# Patient Record
Sex: MALE | Race: WHITE | NOT HISPANIC OR LATINO | Employment: PART TIME | URBAN - METROPOLITAN AREA
[De-identification: names, ages, dates, MRNs, and addresses within clinical notes are randomized per-mention and may not be internally consistent; named-entity substitution may affect disease eponyms.]

---

## 2017-12-14 ENCOUNTER — APPOINTMENT (EMERGENCY)
Dept: RADIOLOGY | Facility: HOSPITAL | Age: 47
End: 2017-12-14
Payer: COMMERCIAL

## 2017-12-14 ENCOUNTER — HOSPITAL ENCOUNTER (EMERGENCY)
Facility: HOSPITAL | Age: 47
Discharge: HOME | End: 2017-12-14
Attending: EMERGENCY MEDICINE | Admitting: EMERGENCY MEDICINE
Payer: COMMERCIAL

## 2017-12-14 VITALS
OXYGEN SATURATION: 98 % | TEMPERATURE: 97.2 F | HEART RATE: 70 BPM | WEIGHT: 200 LBS | SYSTOLIC BLOOD PRESSURE: 146 MMHG | HEIGHT: 66 IN | BODY MASS INDEX: 32.14 KG/M2 | RESPIRATION RATE: 18 BRPM | DIASTOLIC BLOOD PRESSURE: 103 MMHG

## 2017-12-14 DIAGNOSIS — S63.501A SPRAIN OF RIGHT WRIST, INITIAL ENCOUNTER: ICD-10-CM

## 2017-12-14 DIAGNOSIS — D22.9 CHANGE IN NEVUS: Primary | ICD-10-CM

## 2017-12-14 PROCEDURE — 73130 X-RAY EXAM OF HAND: CPT

## 2017-12-14 PROCEDURE — 99283 EMERGENCY DEPT VISIT LOW MDM: CPT

## 2017-12-14 NOTE — ED PROVIDER NOTES
History  Chief Complaint   Patient presents with    Hand Injury     patient is c/o right hand and wrist pain, states that it began 2 weeks ago after punching a punching bag  also states "i have this lump of my face that is changing colors"     Patient is a 51-year-old male coming in today with right hand pain after punching a punching bag approximately 2 weeks ago  There is no other injury sustained  He did not fall tripped per landed on it  There is no numbness or paresthesias  He is right-hand dominant  Patient states it is just achy and throbbing  He has not taken anything for this  The pain does not radiate past the wrist or into his elbow  There is no sustained laceration or open injury  Patient also worried about a spot on the right side of his cheek for the past month  Patient states over the past month he has been using a tanning bed at his gym and someone pointed out to him that the spot is getting darker  He has never been to a dermatologist and does not use S PF lotions  The spot is not itchy, bleeding, or flaky  He has not put anything on it  History provided by:  Patient   used: No        None       History reviewed  No pertinent past medical history  History reviewed  No pertinent surgical history  History reviewed  No pertinent family history  I have reviewed and agree with the history as documented  Social History   Substance Use Topics    Smoking status: Never Smoker    Smokeless tobacco: Never Used    Alcohol use Yes      Comment: social        Review of Systems   Constitutional: Negative for fatigue and fever  Musculoskeletal: Negative for arthralgias, back pain, gait problem, joint swelling, myalgias, neck pain and neck stiffness  Skin: Negative for pallor, rash and wound         Physical Exam  ED Triage Vitals [12/14/17 1245]   Temperature Pulse Respirations Blood Pressure SpO2   (!) 97 2 °F (36 2 °C) 70 18 (!) 146/103 98 %      Temp Source Heart Rate Source Patient Position - Orthostatic VS BP Location FiO2 (%)   Temporal Monitor Sitting Right arm --      Pain Score       8           Orthostatic Vital Signs  Vitals:    12/14/17 1245   BP: (!) 146/103   Pulse: 70   Patient Position - Orthostatic VS: Sitting       Physical Exam   Constitutional: He is oriented to person, place, and time  He appears well-developed and well-nourished  No distress  HENT:   Head: Normocephalic and atraumatic  Nose: Nose normal    Mouth/Throat: Oropharynx is clear and moist    Eyes: Conjunctivae and EOM are normal  Pupils are equal, round, and reactive to light  Neck: Normal range of motion  No tracheal deviation present  Pulmonary/Chest: No stridor  No respiratory distress  Musculoskeletal:        Right wrist: He exhibits normal range of motion, no bony tenderness, no swelling, no effusion, no crepitus, no deformity and no laceration  Arms:  At the right wrist there is no tenderness to palpation  Negative Finkelstein's on the right negative Tinel on the right  There is mild tenderness along the right anatomical snuffbox  There is no obvious external evidence of trauma  There is no tenderness along the 5th metacarpal bone  Lymphadenopathy:     He has no cervical adenopathy  Neurological: He is oriented to person, place, and time  Skin: Skin is warm  Capillary refill takes less than 2 seconds  He is not diaphoretic  Nursing note and vitals reviewed  ED Medications  Medications - No data to display    Diagnostic Studies  Results Reviewed     None                 XR hand 3+ views RIGHT   Final Result by Liliana Velasco MD (12/14 1326)   Probable old healed boxer's type fracture 5th metacarpal      No acute osseous abnormality           Workstation performed: [id number]                    Procedures  Procedures       Phone Contacts  ED Phone Contact    ED Course  ED Course                                MDM  Number of Diagnoses or Management Options  Change in nevus:   Sprain of right wrist, initial encounter:   Diagnosis management comments: Long discussion with patient regarding need for follow-up with PCP and/or Dermatology regarding nevus  1:36 PM  Patient asking to leave stating he is here on his lunch break and needs to the the discharge  Updated on x-ray the patient has no pain on 5th carpal bone  He states he is aware of this old fracture  Will place in a Velcro splint as there is no acute fracture and patient signs and symptoms consistent with wrist sprain  Patient I did initially discuss his need for moisturizer, use of S PF, and to stop using the tanning bed  He is also aware we did not perform scrape biopsies or biopsies in the emergency department  Although I do not feel this nevus appears to be melanoma in nature he is aware that I cannot rule this out and LEs he is seen and had obtained a biopsy by dermatologist        Amount and/or Complexity of Data Reviewed  Tests in the radiology section of CPT®: ordered and reviewed  Independent visualization of images, tracings, or specimens: yes    Risk of Complications, Morbidity, and/or Mortality  Presenting problems: low  Diagnostic procedures: low  Management options: low      CritCare Time    Disposition  Final diagnoses:   Change in nevus   Sprain of right wrist, initial encounter     Time reflects when diagnosis was documented in both MDM as applicable and the Disposition within this note     Time User Action Codes Description Comment    12/14/2017  1:34 PM Raghav Vang Add [D22 9] Change in nevus     12/14/2017  1:36 PM Raghav Vang Add Starr Pazs Sprain of right wrist, initial encounter       ED Disposition     ED Disposition Condition Comment    Discharge  Amy Roach discharge to home/self care      Condition at discharge: Stable        Follow-up Information     Follow up With Specialties Details Why Contact Info    Maria M Devine MD Dermatology Schedule an appointment as soon as possible for a visit in 3 days  [address] [facility] [address] [facility]      Cristiane Moon MD Internal Medicine Schedule an appointment as soon as possible for a visit in 1 week  [address] [facility]          There are no discharge medications for this patient  No discharge procedures on file      ED Provider  Electronically Signed by           Suha Reno DO  12/14/17 7097

## 2017-12-14 NOTE — DISCHARGE INSTRUCTIONS
Sprain   WHAT YOU NEED TO KNOW:   A sprain happens when a ligament is stretched or torn  Ligaments are tough tissues that connect bones  Ligaments support your joints and keep your bones in place  They allow you to lift, lower, or rotate your arms and legs  A sprain may involve one or more ligaments  DISCHARGE INSTRUCTIONS:   Seek care immediately if:   · You have numbness or tingling below the injury, such as in your fingers or toes  · The skin over your sprained area is blue or pale  · Your pain has increased or returned, even after you take pain medicine  Contact your healthcare provider if:   · Your symptoms do not better  · Your swelling has increased or returned  · Your joint becomes more weak or unstable  · You have questions or concerns about your condition or care  Medicines:   · Prescription pain medicine  may be given  Ask how to take this medicine safely  · Acetaminophen  decreases pain and fever  It is available without a doctor's order  Ask how much to take and how often to take it  Follow directions  Acetaminophen can cause liver damage if not taken correctly  · NSAIDs , such as ibuprofen, help decrease swelling, pain, and fever  This medicine is available with or without a doctor's order  NSAIDs can cause stomach bleeding or kidney problems in certain people  If you take blood thinner medicine, always ask your healthcare provider if NSAIDs are safe for you  Always read the medicine label and follow directions  · Take your medicine as directed  Contact your healthcare provider if you think your medicine is not helping or if you have side effects  Tell him or her if you are allergic to any medicine  Keep a list of the medicines, vitamins, and herbs you take  Include the amounts, and when and why you take them  Bring the list or the pill bottles to follow-up visits  Carry your medicine list with you in case of an emergency    Support devices:  Support services, such as an elastic bandage, splint, brace, or cast may be needed  These devices limit your movement and protect your joint  You may need to use crutches if the sprain is in your leg  This will help decrease your pain as you move around  Self-care:   · Rest  your joint so that it can heal  Return to normal activities as directed  · Apply ice  on your injury for 15 to 20 minutes every hour or as directed  Use an ice pack, or put crushed ice in a plastic bag  Cover it with a towel  Ice helps prevent tissue damage and decreases swelling and pain  · Compress  the injured area as directed  Ask your healthcare provider if you should wrap an elastic bandage around your injured ligament  An elastic bandage provides support and helps decrease swelling and movement so your joint can heal      · Elevate  the injured area above the level of your heart as often as you can  This will help decrease swelling and pain  Prop the injured area on pillows or blankets to keep it elevated comfortably  Physical therapy:  A physical therapist teaches you exercises to help improve movement and strength, and to decrease pain  Prevent another sprain:  Regular exercise can strengthen your muscles and help prevent another injury  Do the following before you begin or return to regular exercise or sports training:  · Ask your healthcare provider about the activities you can do  Find out how long your ligament needs to heal  Do not do any physical activity until your healthcare provider says it is okay  If you start activity too soon, you may develop a more serious injury  · Always warm up and stretch  before your regular exercise, sport, or physical activity  · Take it slow  Slowly increase how often and how long you exercise or train  Sudden increases in how often you train may cause you to overstretch or tear your ligament  · Use the right equipment  Always wear shoes that fit well and are made for the activity that you are doing   You involve activities that bend your wrist backward and increase your risk for a wrist injury  · Activities that put stress on your wrist:  If you use crutches for a long time, you may put repeated stress on your wrist  Jobs that require repetitive or forceful work with your wrist also put extra stress on your wrist      · Past injuries:  Bone fractures that did not heal properly or past sprain or strain injuries may weaken your wrist and increase your risk of another injury  · Unstable wrist:  You may have been born with weak wrist ligaments or tendons that injure easily  What are the signs and symptoms of a wrist injury? · Pain in your wrist when you injured it    · Pain, weakness, or numbness in your wrist or hand    · A feeling of something clicking, popping, or tearing inside your wrist    · A change in the shape of your wrist, hand, or fingers    · Trouble moving your wrist or hand  How is a wrist injury diagnosed? Your healthcare provider will examine your hand, arm, and wrist to check for pain, swelling, or numbness  Your healthcare provider will ask you about your signs and symptoms  He will ask how and when you hurt your wrist and if you have had a wrist injury before  He may check the movement and strength of your wrist  You may also need one or more of the following:  · X-rays: You may need x-rays of your wrist, hand, and forearm to check for broken bones  X-rays of both your injured and uninjured wrists may be taken  · CT scan: This test is also called a CAT scan  An x-ray machine uses a computer to take pictures of your forearm, wrist, and hand  The pictures may show if you have broken a bone  You may be given a dye before the pictures are taken to help healthcare providers see the pictures better  Tell the healthcare provider if you have ever had an allergic reaction to contrast dye      · MRI:  This scan uses powerful magnets and a computer to take pictures of your forearm, wrist, and hand  An MRI may show if you have broken a bone  You may be given a dye to help the pictures show up better  Tell the healthcare provider if you have ever had an allergic reaction to contrast dye  Do not enter the MRI room with anything metal  Metal can cause serious injury  Tell the healthcare provider if you have any metal in or on your body  How is a wrist injury treated? Your treatment depends on the type of wrist injury and amount of tissue damage you have  You may need any of the following:  · Wrist supports:  A cast or splint may be put on your fingers, hand, and wrist to support your wrist and prevent further damage  Ask for more information about wearing casts and splints  · NSAIDs:  These medicines decrease swelling and pain  NSAIDs are available without a doctor's order  Ask your healthcare provider which medicine is right for you  Ask how much to take and when to take it  Take as directed  NSAIDs can cause stomach bleeding and kidney problems if not taken correctly  · Pain medicine: You may be given a prescription medicine to decrease pain  Do not wait until the pain is severe before you take this medicine  · Steroids: You may be given steroids to decrease swelling and pain in your wrist  This may be given as a shot  · Arthroscopic surgery: This is a type of surgery that uses a small, flexible tube with a light and camera on the end to see inside your wrist  The tube is put into your wrist through small incisions  During this surgery, healthcare providers may repair tears and remove injured and loose tissues  · Open surgery: This surgery may be done to repair or replace a torn ligament  Your healthcare provider may use screws or wires to attach the bones in your wrist together  How can I manage my symptoms? · Rest:  You may need to rest your wrist for at least 48 hours and avoid activities that cause you pain  Ask what activities you should avoid and for how long      · Ice:  Yassine helps decrease swelling and pain  Ice may also help prevent tissue damage  Use an ice pack or put crushed ice in a plastic bag  Cover it with a towel and place it on your injured wrist for 15 to 20 minutes every hour as directed  · Compression:  Your healthcare provider may suggest you wrap your wrist with an elastic bandage  This will help decrease swelling, support your wrist, and help it heal  Wear your wrist wrap as directed  Ask for instructions about how to wrap your wrist     · Elevation:  When you sit or lie down, keep your wrist at or above the level of your heart  This may help decrease pain and swelling  · Physical therapy:  Your healthcare provider may recommend that you go to physical therapy  A physical therapist shows you how to do exercises that can help strengthen your wrist and improve its range of movement  These exercises may also help decrease your pain  What are the risks of having a wrist injury? · Wrist supports may press on nerves and blood vessels, cause pain, or irritate your skin  Wrist supports may also cause your muscles to shorten and limit the amount of movement you have in your wrist  Rest, wrist supports, and other nonsurgical treatments may not help your wrist heal, and you may need surgery  Even if you have arthroscopic surgery, you may need open surgery later  Surgery to repair your wrist injury may cause nerve and tissue damage or lead to an infection  As a result of surgery, your wrist may feel stiff and swollen  Even with treatment, your wrist may become weak, stiff, or difficult to move  · Without treatment, your symptoms, such as pain, weakness, swelling, and stiffness, may get worse  Tissues, such as nerves and muscles, may be damaged from swelling and lack of blood supply  You may have a higher risk of getting arthritis in your wrist  Your injury may prevent you from having complete movement in your hand and fingers     How can I prevent a wrist injury?    · Do strengthening exercises: Your healthcare provider or physical therapist may suggest that you do exercises to strengthen your hand and arm muscles  Ask when you may return to your regular physical activities or sports  If you start to exercise too soon it may cause you to injure your wrist again  · Protect your wrists:  Wrist guard splints or protective tape can help support your wrist during exercise and sports  These devices may also keep your wrist from bending too far back  Ask for more information about the type of wrist support that you should use  When should I contact my healthcare provider? · You have a fever  · The bruising, swelling, or pain in your wrist gets worse  · You have questions or concerns about your condition or care  When should I seek immediate care? · The skin on or near your wrist or hand feels cold, or it turns blue or white  · The skin on or near your wrist or hand is very tight and swollen  · You have new trouble moving and using your hands, fingers, or wrist     · Your wrist, hands, or fingers become swollen, red, numb, or they tingle  · Your wrist has any open wounds that are red, swollen, warm, or have pus coming from them  CARE AGREEMENT:   You have the right to help plan your care  Learn about your health condition and how it may be treated  Discuss treatment options with your caregivers to decide what care you want to receive  You always have the right to refuse treatment  The above information is an  only  It is not intended as medical advice for individual conditions or treatments  Talk to your doctor, nurse or pharmacist before following any medical regimen to see if it is safe and effective for you  © 2017 [address] [employer] Information is for End User's use only and may not be sold, redistributed or otherwise used for commercial purposes   All illustrations and images included in CareNotes® are the copyrighted property of [employer]  or [address]

## 2018-05-06 ENCOUNTER — OFFICE VISIT (OUTPATIENT)
Dept: URGENT CARE | Facility: MEDICAL CENTER | Age: 48
End: 2018-05-06
Payer: COMMERCIAL

## 2018-05-06 VITALS
TEMPERATURE: 97.6 F | DIASTOLIC BLOOD PRESSURE: 86 MMHG | HEART RATE: 66 BPM | RESPIRATION RATE: 20 BRPM | OXYGEN SATURATION: 99 % | SYSTOLIC BLOOD PRESSURE: 128 MMHG

## 2018-05-06 DIAGNOSIS — S91.332A PUNCTURE WOUND OF LEFT FOOT, INITIAL ENCOUNTER: Primary | ICD-10-CM

## 2018-05-06 PROCEDURE — G0382 LEV 3 HOSP TYPE B ED VISIT: HCPCS | Performed by: PHYSICIAN ASSISTANT

## 2018-05-06 PROCEDURE — 99283 EMERGENCY DEPT VISIT LOW MDM: CPT | Performed by: PHYSICIAN ASSISTANT

## 2018-05-06 PROCEDURE — 90715 TDAP VACCINE 7 YRS/> IM: CPT

## 2018-05-06 RX ORDER — CEPHALEXIN 500 MG/1
500 CAPSULE ORAL EVERY 8 HOURS SCHEDULED
Qty: 21 CAPSULE | Refills: 0 | Status: SHIPPED | OUTPATIENT
Start: 2018-05-06 | End: 2018-05-13

## 2018-05-06 RX ORDER — OMEPRAZOLE 20 MG/1
20 CAPSULE, DELAYED RELEASE ORAL DAILY
COMMUNITY

## 2018-05-06 NOTE — PROGRESS NOTES
[facility]        NAME: Anthony Vargas is a 50 y o  male  : 1970    MRN: [id number]  DATE: May 6, 2018  TIME: 9:00 AM    Assessment and Plan   Puncture wound of left foot, initial encounter [S91 332A]  1  Puncture wound of left foot, initial encounter  TDAP Vaccine greater than or equal to 6yo    cephalexin (KEFLEX) 500 mg capsule         Patient Instructions       Follow up with PCP in 3-5 days  Proceed to  ER if symptoms worsen  Chief Complaint     Chief Complaint   Patient presents with    Puncture Wound     pt stepped on nail , here for TDAP and evaluation of site          History of Present Illness       The patient is a 49-year-old male presents with the pain of his left foot after stepping on a nail 1 day prior  Patient states the nail puncture through shoe into his left foot, he was able to remove the object in its entirety  He complains of 7/10 pain with weight-bearing activity, patient is unaware of his last tetanus vaccination  Review of Systems   Review of Systems   Constitutional: Negative  Musculoskeletal: Negative for gait problem and joint swelling  Skin: Positive for wound  Current Medications       Current Outpatient Prescriptions:     omeprazole (PriLOSEC) 20 mg delayed release capsule, Take 20 mg by mouth daily, Disp: , Rfl:     cephalexin (KEFLEX) 500 mg capsule, Take 1 capsule (500 mg total) by mouth every 8 (eight) hours for 7 days, Disp: 21 capsule, Rfl: 0    Current Allergies     Allergies as of 2018    (No Known Allergies)            The following portions of the patient's history were reviewed and updated as appropriate: allergies, current medications, past family history, past medical history, past social history, past surgical history and problem list      Past Medical History:   Diagnosis Date    GERD (gastroesophageal reflux disease)        No past surgical history on file  No family history on file        Medications have been verified  Objective   /86   Pulse 66   Temp 97 6 °F (36 4 °C) (Temporal)   Resp 20   SpO2 99%        Physical Exam     Physical Exam   Constitutional: He appears well-developed and well-nourished  No distress  Cardiovascular: Normal rate, regular rhythm and normal heart sounds  No murmur heard    Pulmonary/Chest: Effort normal and breath sounds normal    Musculoskeletal:        Feet:    Skin:

## 2018-05-06 NOTE — PATIENT INSTRUCTIONS
1  Take Keflex 500mg  1 tablet 3x daily x 7 days  2  Motrin as needed for pain  3  Watch for S&S of increasing infection; redness, swelling, drainage, increasing pain or fever  4  Follow-up if symptoms worsen

## 2018-06-15 ENCOUNTER — OFFICE VISIT (OUTPATIENT)
Dept: URGENT CARE | Facility: MEDICAL CENTER | Age: 48
End: 2018-06-15
Payer: COMMERCIAL

## 2018-06-15 VITALS
HEART RATE: 56 BPM | DIASTOLIC BLOOD PRESSURE: 90 MMHG | HEIGHT: 66 IN | BODY MASS INDEX: 32.98 KG/M2 | WEIGHT: 205.2 LBS | OXYGEN SATURATION: 98 % | RESPIRATION RATE: 18 BRPM | TEMPERATURE: 97.8 F | SYSTOLIC BLOOD PRESSURE: 129 MMHG

## 2018-06-15 DIAGNOSIS — K59.1 FUNCTIONAL DIARRHEA: Primary | ICD-10-CM

## 2018-06-15 PROCEDURE — 99283 EMERGENCY DEPT VISIT LOW MDM: CPT | Performed by: FAMILY MEDICINE

## 2018-06-15 PROCEDURE — G0382 LEV 3 HOSP TYPE B ED VISIT: HCPCS | Performed by: FAMILY MEDICINE

## 2018-06-15 NOTE — PATIENT INSTRUCTIONS
1  Bananas, rice, applesauce, toast  2  Eat a more consistent/well balanced diet   3  Avoid greasy, fatty, fried foods  4  If diarrhea persists, follow up with your PCP     Acute Diarrhea   WHAT YOU NEED TO KNOW:   Acute diarrhea starts quickly and lasts a short time, usually 1 to 3 days  It can last up to 2 weeks  You may not be able to control your diarrhea  Acute diarrhea usually stops on its own  DISCHARGE INSTRUCTIONS:   Return to the emergency department if:   · You feel confused  · Your heartbeat is faster than normal      · Your eyes look deeply sunken, or you have no tears when you cry  · You urinate less than usual, or your urine is dark yellow  · You have blood or mucus in your stools  · You have severe abdominal pain  · You are unable to drink any liquids  Contact your healthcare provider if:   · Your symptoms do not get better with treatment  · You have a fever higher than 101 3°F (38 5°C)  · You have trouble eating and drinking because you are vomiting  · You are thirsty or have a dry mouth  · Your diarrhea does not get better in 7 days  · You have questions or concerns about your condition or care  Follow up with your healthcare provider as directed:  Write down your questions so you remember to ask them during your visits  Medicines:  · Diarrhea medicine  is an over-the-counter medicine that helps slow or stop your diarrhea  If you take other medicines, talk to your healthcare provider before you take diarrhea medicine  · Antibiotics  may be given to help treat an infection caused by bacteria  · Antiparasitics  may be given to treat an infection caused by parasites  · Take your medicine as directed  Contact your healthcare provider if you think your medicine is not helping or if you have side effects  Tell him of her if you are allergic to any medicine  Keep a list of the medicines, vitamins, and herbs you take   Include the amounts, and when and why you take them  Bring the list or the pill bottles to follow-up visits  Carry your medicine list with you in case of an emergency  Self-care:   · Drink liquids as directed  Liquids will help prevent dehydration caused by diarrhea  Ask your healthcare provider how much liquid to drink each day and which liquids are best for you  You may need to drink an oral rehydration solution (ORS)  An ORS has the right amounts of water, salts, and sugar you need to replace body fluids  You can buy an ORS at most grocery stores and pharmacies  · Eat foods that are easy to digest   Examples include rice, lentils, cereal, bananas, potatoes, and bread  It also includes some fruits (bananas, melon), well-cooked vegetables, and lean meats  Avoid foods high in fiber, fat, and sugar  Also avoid caffeine, alcohol, dairy, and red meat until your diarrhea is gone  Prevent acute diarrhea:   · Wash your hands often  Use soap and water  Wash your hands before you eat or prepare food  Also wash your hands after you use the bathroom  Use an alcohol-based hand gel when soap and water are not available  · Keep bathroom surfaces clean  This helps prevent the spread of germs that cause acute diarrhea  · Wash fruits and vegetables well before you eat them  This can help remove germs that cause diarrhea  If possible, remove the skin from fruits and vegetables, or cook them well before you eat them  · Cook meat as directed  ¨ Cook ground meat  to 160°F      ¨ Cook ground poultry, whole poultry, or cuts of poultry  to at least 165°F  Remove the meat from heat  Let it stand for 3 minutes before you eat it  ¨ Cook whole cuts of meat other than poultry  to at least 145°F  Remove the meat from heat  Let it stand for 3 minutes before you eat it  · Wash dishes that have touched raw meat with hot water and soap  This includes cutting boards, utensils, dishes, and serving containers       · Place raw or cooked meat in the refrigerator as soon as possible  Bacteria can grow in meat that is left at room temperature too long  · Do not eat raw or undercooked oysters, clams, or mussels  These foods may be contaminated and cause infection  · Drink filtered or treated water only when you travel  Do not put ice in your drinks  Drink bottled water whenever possible  © 2017 [employer] Information is for End User's use only and may not be sold, redistributed or otherwise used for commercial purposes  All illustrations and images included in CareNotes® are the copyrighted property of [employer]  or Damir Villalta  The above information is an  only  It is not intended as medical advice for individual conditions or treatments  Talk to your doctor, nurse or pharmacist before following any medical regimen to see if it is safe and effective for you

## 2018-06-15 NOTE — PROGRESS NOTES
[facility]        NAME: Ledora Dancer is a 50 y o  male  : 1970    MRN: [id number]  DATE: Libertad 15, 2018  TIME: 11:15 AM    Assessment and Plan   Functional diarrhea [K59 1]  1  Functional diarrhea           Patient Instructions   1  Bananas, rice, applesauce, toast  2  Eat a more consistent/well balanced diet   3  Avoid greasy, fatty, fried foods  4  If diarrhea persists, follow up with your PCP     Chief Complaint     Chief Complaint   Patient presents with    Diarrhea     2 weeks         History of Present Illness        26-year-old  Male presents for multiple complaints  The 1st complaint is that he has had diarrhea off and on for the past 2 weeks  He is concerned because his dog was diagnosed with hookworm  He denies having any contact with the dogs stool that would put him at risk  He does report that he has been trying to lose weight by dieting  However, he eats well for few days and then binge eats on take out  He develops diarrhea the day after binge eating  She denies fever, chills, fatigue, abdominal pain, vomiting, or blood in stool  His 2nd complaint is right thumb pain that he has had for the past few months  He had an x-ray the time of injury that was "normal"  but has not had follow-up  He states that he is not from this area and is here for work purposes  He does not have a family doctor  He has not attempted any over-the-counter therapies for either complaint  Review of Systems   Review of Systems   Constitutional: Negative for activity change, chills, fatigue and fever  Gastrointestinal: Positive for diarrhea  Negative for abdominal pain, blood in stool, nausea and vomiting  Musculoskeletal: Positive for arthralgias  Negative for joint swelling  Skin: Negative for color change  Neurological: Negative for headaches           Current Medications       Current Outpatient Prescriptions:     omeprazole (PriLOSEC) 20 mg delayed release capsule, Take 20 mg by mouth daily, Disp: , Rfl:     Current Allergies     Allergies as of 06/15/2018    (No Known Allergies)            The following portions of the patient's history were reviewed and updated as appropriate: allergies, current medications, past family history, past medical history, past social history, past surgical history and problem list      Past Medical History:   Diagnosis Date    GERD (gastroesophageal reflux disease)        History reviewed  No pertinent surgical history  History reviewed  No pertinent family history  Medications have been verified  Objective   /90 (BP Location: Right arm, Patient Position: Sitting)   Pulse 56   Temp 97 8 °F (36 6 °C) (Temporal)   Resp 18   Ht 5' 6" (1 676 m)   Wt 93 1 kg (205 lb 3 2 oz)   SpO2 98%   BMI 33 12 kg/m²        Physical Exam     Physical Exam   Constitutional: He is oriented to person, place, and time  Vital signs are normal  He appears well-developed and well-nourished  No distress  HENT:   Head: Normocephalic  Cardiovascular: Normal rate, regular rhythm, S1 normal, S2 normal and normal heart sounds  No murmur heard  Pulmonary/Chest: Effort normal and breath sounds normal  No respiratory distress  He has no decreased breath sounds  He has no wheezes  He has no rales  Abdominal: Soft  Normal appearance  He exhibits no distension  There is no tenderness  Musculoskeletal: Normal range of motion  He exhibits no edema, tenderness or deformity  Neurological: He is alert and oriented to person, place, and time  Skin: Skin is warm and dry